# Patient Record
Sex: FEMALE | Race: WHITE
[De-identification: names, ages, dates, MRNs, and addresses within clinical notes are randomized per-mention and may not be internally consistent; named-entity substitution may affect disease eponyms.]

---

## 2018-04-20 ENCOUNTER — HOSPITAL ENCOUNTER (EMERGENCY)
Dept: HOSPITAL 25 - UCCORT | Age: 68
Discharge: HOME | End: 2018-04-20
Payer: MEDICARE

## 2018-04-20 VITALS — SYSTOLIC BLOOD PRESSURE: 119 MMHG | DIASTOLIC BLOOD PRESSURE: 65 MMHG

## 2018-04-20 DIAGNOSIS — Z91.041: ICD-10-CM

## 2018-04-20 DIAGNOSIS — Z88.3: ICD-10-CM

## 2018-04-20 DIAGNOSIS — H66.91: Primary | ICD-10-CM

## 2018-04-20 PROCEDURE — G0463 HOSPITAL OUTPT CLINIC VISIT: HCPCS

## 2018-04-20 PROCEDURE — 99212 OFFICE O/P EST SF 10 MIN: CPT

## 2018-04-20 NOTE — UC
Ear Complaint HPI





- HPI Summary


HPI Summary: 





patient complaining of right ear pain, sore throat and sinus congestion chills. 

for 3 days





- History of Current Complaint


Hx Obtained From: Patient


Hx Last Menstrual Period: Post menapause


Pregnant?: No


Onset/Duration: Sudden Onset, Lasting Weeks


Severity Initially: Moderate


Severity Currently: Moderate


Pain Intensity: 0





<Shelby Bensno - Last Filed: 04/20/18 11:21>





<Diamante Gonzalez - Last Filed: 04/20/18 11:30>





- History of Current Complaint


Chief Complaint: UCRespiratory


Stated Complaint: (R) EAR, COUGH, CONGESTION


Time Seen by Provider: 04/20/18 10:49





- Allergies/Home Medications


Allergies/Adverse Reactions: 


 Allergies











Allergy/AdvReac Type Severity Reaction Status Date / Time


 


nitrofurantoin Allergy Unknown Hives Verified 04/20/18 10:55





[From Macrobid]     


 


IVP DYE Allergy  Anaphylatic Uncoded 04/20/18 10:55





   Shock  











Home Medications: 


 Home Medications





Cholecalciferol (Vitamin D3) [Vitamin D3] 1,000 unit PO DAILY 04/20/18 [History 

Confirmed 04/20/18]


Dextromethorphan HBr [Tussin Cough] 15 mg PO PRN 04/20/18 [History]


Multivitamins/Minerals TAB* [Theragran/minerals TAB*] 1 tab PO DAILY 04/20/18 [

History Confirmed 04/20/18]











PMH/Surg Hx/FS Hx/Imm Hx


Previously Healthy: Yes





- Surgical History


Surgical History: Yes


Surgery Procedure, Year, and Place: gall bladder.  [facility]





- Family History


Known Family History: Positive: Hypertension





- Social History


Alcohol Use: Occasionally


Substance Use Type: None


Smoking Status (MU): Never Smoked Tobacco





<Shelby Benson - Last Filed: 04/20/18 11:21>





Review of Systems


Constitutional: Negative


Skin: Negative


Eyes: Negative


ENT: Sore Throat, Ear Ache


Respiratory: Negative


Cardiovascular: Negative


Gastrointestinal: Negative


Genitourinary: Negative


Motor: Negative


Neurovascular: Negative


Musculoskeletal: Negative


Neurological: Headache


Psychological: Negative


Is Patient Immunocompromised?: No


All Other Systems Reviewed And Are Negative: Yes





<Shelby Benson - Last Filed: 04/20/18 11:21>





Physical Exam


Triage Information Reviewed: Yes


Appearance: Ill-Appearing, Pain Distress, Obese


Vital Signs: 


 Initial Vital Signs











Temp  98 F   04/20/18 10:47


 


Pulse  82   04/20/18 10:47


 


Resp  18   04/20/18 10:47


 


BP  119/65   04/20/18 10:47


 


Pulse Ox  96   04/20/18 10:47











Vital Signs Reviewed: Yes


Eye Exam: Normal


ENT Exam: Normal


Dental Exam: Normal


Neck exam: Normal


Respiratory Exam: Normal


Respiratory: Positive: Chest non-tender, Lungs clear, Normal breath sounds


Cardiovascular Exam: Normal


Cardiovascular: Positive: RRR, No Murmur, Pulses Normal


Abdominal Exam: Normal


Abdomen Description: Positive: Nontender, No Organomegaly, Soft


Bowel Sounds: Positive: Present


Musculoskeletal Exam: Normal


Neurological Exam: Normal


Psychological Exam: Normal


Skin Exam: Normal





<Shelby Benson - Last Filed: 04/20/18 11:21>


Vital Signs: 


 Initial Vital Signs











Temp  98 F   04/20/18 10:47


 


Pulse  82   04/20/18 10:47


 


Resp  18   04/20/18 10:47


 


BP  119/65   04/20/18 10:47


 


Pulse Ox  96   04/20/18 10:47














<Diamante Gonzalez - Last Filed: 04/20/18 11:30>





Ear Complaint Course/Dx





- Course


Course Of Treatment: hx obtained, exam performed ,meds reviewed, treated for 

right otitis media





- Differential Dx/Diagnosis


Differential Diagnosis/HQI/PQRI: Cerumen Impaction, Otitis Externa, Otitis Media

, Perforated TM, Pharyngitis, URI


Provider Diagnoses: right otitis media





<Shelby Benson - Last Filed: 04/20/18 11:21>





Discharge





- Sign-Out/Discharge


Documenting (check all that apply): Discharge





- Billing Disposition and Condition


Condition: STABLE


Disposition: HOME





<Shelby Benson - Last Filed: 04/20/18 11:21>





- Billing Disposition and Condition


Condition: STABLE


Disposition: HOME





<Diamante Gonzalez - Last Filed: 04/20/18 11:30>





- Discharge Plan


Condition: Stable


Disposition: HOME


Prescriptions: 


Amoxicillin PO (*) [Amoxicillin 875 MG (*)] 875 mg PO BID #20 tab


predniSONE TAB* [Deltasone TAB*] 40 mg PO DAILY #14 tab


Referrals: 


Kelly Gonzalez MD [Primary Care Provider] - 


Additional Instructions: 


1. take the medication as prescribed.


2. Increase fluid intake


3. TYlenol or MOtrin for pain and fever


4. Warm compresses to right ear for pain.





Attestation Statement


User Type: Provider - I was available for consult. This patient was seen by the 

RICHI. The patient was not presented to, seen by, or examined by me. -Bartolo





<Diamante Gonzalez - Last Filed: 04/20/18 11:30>